# Patient Record
Sex: FEMALE | Race: WHITE
[De-identification: names, ages, dates, MRNs, and addresses within clinical notes are randomized per-mention and may not be internally consistent; named-entity substitution may affect disease eponyms.]

---

## 2019-10-10 ENCOUNTER — HOSPITAL ENCOUNTER (EMERGENCY)
Dept: HOSPITAL 25 - UCCORT | Age: 34
Discharge: HOME | End: 2019-10-10
Payer: COMMERCIAL

## 2019-10-10 VITALS — SYSTOLIC BLOOD PRESSURE: 129 MMHG | DIASTOLIC BLOOD PRESSURE: 75 MMHG

## 2019-10-10 DIAGNOSIS — R05: ICD-10-CM

## 2019-10-10 DIAGNOSIS — J03.90: Primary | ICD-10-CM

## 2019-10-10 PROCEDURE — G0463 HOSPITAL OUTPT CLINIC VISIT: HCPCS

## 2019-10-10 PROCEDURE — 99212 OFFICE O/P EST SF 10 MIN: CPT

## 2019-10-10 NOTE — UC
Throat Pain/Nasal Vic HPI





- HPI Summary


HPI Summary: 





Pt presents with c/o ST, fever, chills, cough X 4 days.  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 





- History of Current Complaint


Chief Complaint: UCGeneralIllness


Stated Complaint: SORE THROAT


Time Seen by Provider: 10/10/19 19:49


Hx Obtained From: Patient


Hx Last Menstrual Period: 9/11/19


Pregnant?: No


Onset/Duration: Sudden Onset, Lasting Days, Still Present, Worse Since - onset


Severity: Moderate


Pain Intensity: 7


Cough: Nonproductive


Associated Signs & Symptoms: Positive: Dysphagia, Fever





- Epiglottits Risk Factors


Epiglottis Risk Factors: Sudden Onset





- Allergies/Home Medications


Allergies/Adverse Reactions: 


 Allergies











Allergy/AdvReac Type Severity Reaction Status Date / Time


 


No Known Allergies Allergy   Verified 01/14/19 21:15











Home Medications: 


 Home Medications





D-Methorphan/PE/Acetaminophen [Theraflu Ms Severe Cold Pckt] 1 cordell PO ONCE 10/10

/19 [History Confirmed 10/10/19]


Naproxen Sodium [Naproxen 220 mg] 440 mg PO BID PRN 10/10/19 [History Confirmed 

10/10/19]











PMH/Surg Hx/FS Hx/Imm Hx


Previously Healthy: Yes





- Surgical History


Surgical History: None





- Family History


Known Family History: Positive: Cardiac Disease





- Social History


Occupation: Employed Full-time


Lives: With Family


Alcohol Use: Rare


Substance Use Type: None


Smoking Status (MU): Never Smoked Tobacco


Have You Smoked in the Last Year: No





- Immunization History


Vaccination Up to Date: Yes





Review of Systems


All Other Systems Reviewed And Are Negative: Yes


Constitutional: Positive: Fever, Chills, Fatigue


Skin: Positive: Negative


Eyes: Positive: Negative


ENT: Positive: Sore Throat


Respiratory: Positive: Cough


Cardiovascular: Positive: Negative


Gastrointestinal: Positive: Negative


Genitourinary: Positive: Negative


Motor: Positive: Negative


Neurovascular: Positive: Negative


Musculoskeletal: Positive: Myalgia


Neurological: Positive: Headache


Psychological: Positive: Negative


Is Patient Immunocompromised?: No





Physical Exam


Triage Information Reviewed: Yes


Appearance: Ill-Appearing, Pain Distress


Vital Signs: 


 Initial Vital Signs











Temp  99.5 F   10/10/19 19:09


 


Pulse  87   10/10/19 19:09


 


Resp  20   10/10/19 19:09


 


BP  129/75   10/10/19 19:09


 


Pulse Ox  100   10/10/19 19:09











Vital Signs Reviewed: Yes


Eye Exam: Normal


ENT: Positive: Pharyngeal erythema, Tonsillar swelling


Dental Exam: Normal


Neck exam: Normal


Respiratory: Positive: Normal breath sounds


Musculoskeletal Exam: Normal


Neurological Exam: Normal


Psychological Exam: Normal


Skin Exam: Normal





Throat Pain/Nasal Course/Dx





- Differential Dx/Diagnosis


Differential Diagnosis/HQI/PQRI: Pharyngitis, Tonsillitis


Provider Diagnosis: 


 Tonsillitis, Cough








Discharge ED





- Sign-Out/Discharge


Documenting (check all that apply): Patient Departure


All imaging exams completed and their final reports reviewed: No Studies





- Discharge Plan


Condition: Stable


Disposition: HOME


Prescriptions: 


Azithromycin 500 mg PO DAILY #5 tablet


Codeine Phosphate/Guaifenesin [Guaifen-Codeine 100-10 mg/5 ml] 5 ml PO BEDTIME 

PRN #20 ml MDD 5 ml


 PRN Reason: Cough


predniSONE TAB* [Deltasone 10 MG TAB*] 30 mg PO DAILY #12 tab


Patient Education Materials:  Tonsillitis (ED), Acute Cough (ED)


Referrals: 


Kwame Rain MD [Primary Care Provider] - If Needed





- Billing Disposition and Condition


Condition: STABLE


Disposition: Home